# Patient Record
Sex: FEMALE | Race: WHITE | Employment: FULL TIME | URBAN - METROPOLITAN AREA
[De-identification: names, ages, dates, MRNs, and addresses within clinical notes are randomized per-mention and may not be internally consistent; named-entity substitution may affect disease eponyms.]

---

## 2017-06-02 ENCOUNTER — HOSPITAL ENCOUNTER (OUTPATIENT)
Dept: FAMILY PLANNING/WOMEN'S HEALTH CLINIC | Age: 32
Discharge: HOME | End: 2017-06-02
Payer: COMMERCIAL

## 2017-06-02 PROCEDURE — 99202 OFFICE O/P NEW SF 15 MIN: CPT

## 2017-06-02 NOTE — PROGRESS NOTES
[address]  [address]  [address] [place] [address], [facility]0 [facility] [address]  Dept: [phone]    LACTATION REPORT TO HEALTHCARE PROVIDER    Date: 6/2/2017    Dear Crescencio Alvarenga MD: Leslie Raygoza: This is to inform you that I have seen    Baby name (First Name, Last Name): Dale Pedraza                                  Mother: Alondra Arreola    Reason for Visit: difficulty latching;plugged nipple pores;sore nipples    Baby date of birth: [birth year]     Baby's Gestational age: 40  Birth Weight (Born Outside of [address]): 2.551 kg (5 lb 10 oz)     Discharge weight: 2.381 kg (5 lb 4 oz)     Date                  Weight        Recorded Weight Date 1: 05/26/17  Recorded Weight 1: 3.544 kg (7 lb 13 oz)        Recorded  Weight Date 2: 06/02/17  Recorded Weight 2: 3.762 kg (8 lb 4.7 oz)    Pre-feed Weight: 3.771 kg (8 lb 5 oz)  Post-feed Weight Left Breast: 3.819 kg (8 lb 6.7 oz)  Post-feed Weight Right Breast: 3.819 kg (8 lb 6.7 oz)     Total amount of milk transferred: Total Weight Gain: 1.7 oz    Total time of feeding: 10 minutes    Amount of milk pumped (PC): 45 ml    Amount of milk/formula supplemented: 0    Breast Assessment:  Left Breast: Medium  Left Nipple: Everted; Moderate long; Intact; Tender  Right Breast: Medium  Right Nipple: Intact; Moderate long;Everted;Tender    Oral assessement:  (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 Mother in for sore nipples and latch assessment. Baby 10 weeks old. Mother also complains of recurrent plugged ducts. Oral assessment on baby notable for a posterior tongue tie and tight labial lip tie which may be contributing to painful latch. Reviewed with mother and information given for Dr. Vinny Dexter if needed for evaluation. Observation of mother's techniques also notable for shallow latch techniques.  When taught deep latch and baby placed deeply on breast, she reported no pain with latch or feeding. Practiced deep latch techniques on both sides and mother able to return demonstrate improved technique and improved comfort. Evaluated mother's personal Ameda breast pump use and fitted with larger breast shield size which also improved nipple comfort. Encouraged continued exclusive frequent BF and for mother to call [facility] with any BF questions or for any needed follow up. Please feel free to contact me at [facility] with any questions or concerns.     Thank you,    Karyna Bermudez RN IBCLC

## 2017-06-02 NOTE — DISCHARGE INSTRUCTIONS
HELPING BABY LATCH CORRECTLY                                             1. Stimulate the rooting reflex by making a circular motion around the lips, in one direction. Give baby verbal and visual cues by saying Johanna Lomaxey and demonstrating with your face approximately 10 inches away from babys face to elicit a wide, open mouth. 2. Stimulate let down by massaging or pumping breasts for 1-2 minutes prior to latching. 3. Allow baby to suck clean finger, placed nail side down. 4. Place nursing pillow or bed pillows next to you high enough to bring baby to the level of the nipple. 5. Position baby belly to belly with you, making sure that head, neck and torso are in alignment. 6. Support babys head during latch. Babys body weight should rest comfortably on nursing pillow. 7. Roll the nipple gently between the fingers to make it stand out. 8. Fingers should be placed at the edge of areola and the breast should be compressed so that it enters parallel to babys open mouth, with the nipple slightly pointed toward the roof of the babys mouth. (Think of compressing a large sandwich to fit into your mouth!)   9. Wait for WIDE mouth, and then bring the baby to the breast, getting as much of the areola into the mouth as possible. Gentle downward pressure on the lower jaw as baby is coming to breast, may be helpful in keeping mouth open wide initially. 10. Hold the breast compressed and the baby gently in place to allow the baby to feel the nipple and begin suckling.  (U-hold may be helpful)  11. If baby has not latched correctly, begin the process again. 12. If baby is latched correctly, lips should be flanged outward, nose and chin should lightly   touch the breast.  No clicking or lip smacking should be heard. 13. Swallowing should be observed intermittently during entire feeding. Baby should pause   for breathing.   14. It is normal to have some brief discomfort initially when the baby latches, but it should ruth as the feeding progresses, particularly once the milk lets down. 15. If baby tends to compress nipple during feedings, un-latch after first let-down, roll nipples into round shape and re-latch. PLUGGED DUCT CARE PLAN  1. Feed your baby every 2-3 hours. 2. Start feeding with the affected breast.  3. Gently massage the tender area while nursing the baby. 4. Warm moist heat may be applied to the tender area for 10 minutes prior to nursing. 5. Monitor yourself for flu-like symptoms, painful, reddened areas in the breast and/or fever over 101F. Report these symptoms to your healthcare provider immediately. MASTITIS CARE PLAN  1. Symptoms of mastitis include flu-like symptoms (tiredness, muscle aches, headache, nausea), fever over 101F, or a tender reddened area in breast that doesn't resolve with plugged duct treatment. Mastitis requires medical treatment. Contact YOUR DOCTOR for antibiotic prescription. 2. Take all antibiotics as prescribed and FINISH the entire prescription. 3. Continue to nurse or pump the affected breast every 2-3 hours around the clock. Breasts will be more comfortable if they are not overly full. 4. Warm moist heat and gentle massage may be applied to affected breast as desired for comfort. 5. Symptoms should improve within 48 hours of starting antibiotic treatment. CALL YOUR DOCTOR if symptoms do not improve or worsen. Tongue-tie is an anatomical abnormality affecting the ability of the tongue to move in an appropriate way to facilitate proper suckling at the breast. This may affect either the ability to stick the tongue out or to lift the tongue upward or both.     Tongue-tie can sometimes be seen when the baby raises the tongue and sometimes can on ly be felt by an experienced medical professional on exam.    Terra Plana can contribute to nipple pain and damage, ineffective breast emptying, tiring easily at the breast, short sleep cycles, reflux and colic, lip blisters, and poor weight gain. In a majority of cases,  tongue tie is accompanied by lip tie as well which prevents the upper lip from flanging out properly. Almost all tongue-tie and lip-tie can be treated with a simple medical procedure to release the tie. This procedure should be performed by an experienced medical professional. Melisa Singh refers clients suspected of tongue or lip tie to Dr. Karissa Mac at [address] [place] [address], and [facility] located at [address] [place] [address], [facility] [phone]    For additional information on tongue and lip tie and post procedure exercises go to [facility]. Continue frequent BF on demand. Pump as needed for comfort. Follow up with Pediatrician as scheduled and call [facility] with any BF questions or concerns or for any needed follow up.

## 2022-06-15 ENCOUNTER — OFFICE VISIT (OUTPATIENT)
Dept: INTERNAL MEDICINE CLINIC | Age: 37
End: 2022-06-15
Payer: COMMERCIAL

## 2022-06-15 VITALS
RESPIRATION RATE: 16 BRPM | WEIGHT: 144 LBS | HEIGHT: 67 IN | HEART RATE: 62 BPM | OXYGEN SATURATION: 98 % | SYSTOLIC BLOOD PRESSURE: 120 MMHG | TEMPERATURE: 98.1 F | BODY MASS INDEX: 22.6 KG/M2 | DIASTOLIC BLOOD PRESSURE: 81 MMHG

## 2022-06-15 DIAGNOSIS — Z76.89 ESTABLISHING CARE WITH NEW DOCTOR, ENCOUNTER FOR: Primary | ICD-10-CM

## 2022-06-15 PROCEDURE — 99203 OFFICE O/P NEW LOW 30 MIN: CPT | Performed by: INTERNAL MEDICINE

## 2022-06-15 NOTE — PROGRESS NOTES
Beth Phan is a 40 y.o. female and presents with Establish Care (overall check up - labs for breast cancer ), Depression (dx w/ post pardom 3 years ago ), Chest Pain (pt reports occ stabbing radiating chest pain first week of May for the whole weak - pt states she is now having pain in low back since starting menstrual 2 days ago ), and Incontinence (x 3 years, starting after pregnancy)    . Subjective:    New patient. Establish Care    Pt had an episode of chest pain last month- ss sharp/stabbing. Resolved w time after 5 minutes    PMH- post partum depression   Urinary incontinence- consulted w gynecology and was referred to PT   Exercise induced asthma    PSH-none    SH-post partum dula    FH- mother alive ? ? Father cirrhosis, alcoholic   Siblings ? ? HM    Immunizations  Eye care  Dental care  Pap UTD        Review of Systems  Review of systems (12) negative, except noted above.       Past Medical History:   Diagnosis Date    Asthma     Depression      Past Surgical History:   Procedure Laterality Date    HX GYN      HX TONSIL AND ADENOIDECTOMY       Social History     Socioeconomic History    Marital status:    Tobacco Use    Smoking status: Never Smoker    Smokeless tobacco: Never Used   Vaping Use    Vaping Use: Never used   Substance and Sexual Activity    Alcohol use: Not Currently     Comment: occ    Drug use: Not Currently    Sexual activity: Yes     Partners: Male     Birth control/protection: None     Family History   Problem Relation Age of Onset    Breast Cancer Maternal Aunt     Breast Cancer Maternal Great Grandmother        Allergies   Allergen Reactions    Sulfa (Sulfonamide Antibiotics) Unknown (comments)     Hospitalized as toddler, reactions unknown        Objective:  Visit Vitals  /81 (BP 1 Location: Left arm, BP Patient Position: Sitting, BP Cuff Size: Adult)   Pulse 62   Temp 98.1 °F (36.7 °C) (Temporal)   Resp 16   Ht 5' 6.5\" (1.689 m)   Wt 144 lb (65.3 kg)   LMP 06/13/2022   SpO2 98%   BMI 22.89 kg/m²     Physical Exam:   General appearance - alert, well appearing, anxious  Mental status - alert, oriented to person, place, and time  EYE-YANI, EOMI, corneas normal, no foreign bodies  ENT-ENT exam normal, no neck nodes or sinus tenderness    Mouth - mucous membranes moist, pharynx normal without lesions  Neck - supple, no significant adenopathy   Chest - clear to auscultation, no wheezes, rales or rhonchi, symmetric air entry   Heart - normal rate, regular rhythm, normal S1, S2  Abdomen - soft, nontender, nondistended, no masses or organomegaly  Lymph- no adenopathy palpable  Ext-peripheral pulses normal, no pedal edema, no clubbing or cyanosis        No results found for this or any previous visit. Assessment/Plan:    ICD-10-CM ICD-9-CM    1. Establishing care with new doctor, encounter for  A68.61 I04.2 METABOLIC PANEL, COMPREHENSIVE      LIPID PANEL      CBC WITH AUTOMATED DIFF      THYROID CASCADE PROFILE     Orders Placed This Encounter    METABOLIC PANEL, COMPREHENSIVE     Standing Status:   Future     Standing Expiration Date:   6/15/2023    LIPID PANEL     Standing Status:   Future     Standing Expiration Date:   6/15/2023    CBC WITH AUTOMATED DIFF     Standing Status:   Future     Standing Expiration Date:   6/15/2023    THYROID CASCADE PROFILE     Standing Status:   Future     Standing Expiration Date:   6/15/2023       1. Establishing care with new doctor, encounter for  Refer to [facility]ence [facility]    - METABOLIC PANEL, COMPREHENSIVE; Future  - LIPID PANEL; Future  - CBC WITH AUTOMATED DIFF; Future  - THYROID CASCADE PROFILE; Future    There are no Patient Instructions on file for this visit. Follow-up and Dispositions    Return in about 1 year (around 6/15/2023) for annual.           I have reviewed with the patient details of the assessment and plan and all questions were answered. Relevent patient education was performed. The most recent lab findings were reviewed with the patient. An After Visit Summary was printed and given to the patient.       Jordon Polanco MD

## 2022-06-15 NOTE — PROGRESS NOTES
Chief Complaint   Patient presents with    [facility]     overall check up - labs for breast cancer     Depression     dx w/ post pardom 3 years ago     Chest Pain     pt reports occ stabbing radiating chest pain first week of May for the whole weak - pt states she is now having pain in low back since starting menstrual 2 days ago     Incontinence     x 3 years, starting after pregnancy       1. \"Have you been to the ER, urgent care clinic since your last visit? Hospitalized since your last visit? \" No    2. \"Have you seen or consulted any other health care providers outside of the [address] [place] [address] since your last visit? \" No     3. For patients aged 39-70: Has the patient had a colonoscopy / FIT/ Cologuard? NA - based on age      If the patient is female:    4. For patients aged 41-77: Has the patient had a mammogram within the past 2 years? NA - based on age or sex      11. For patients aged 21-65: Has the patient had a pap smear? Yes - Care Gap present.  Rooming MA/LPN to request most recent results [address] - last year